# Patient Record
Sex: MALE | Race: WHITE
[De-identification: names, ages, dates, MRNs, and addresses within clinical notes are randomized per-mention and may not be internally consistent; named-entity substitution may affect disease eponyms.]

---

## 2019-03-07 ENCOUNTER — HOSPITAL ENCOUNTER (OUTPATIENT)
Dept: HOSPITAL 47 - RADCTMAIN | Age: 59
Discharge: HOME | End: 2019-03-07
Attending: FAMILY MEDICINE
Payer: COMMERCIAL

## 2019-03-07 DIAGNOSIS — R04.2: Primary | ICD-10-CM

## 2019-03-07 PROCEDURE — 71260 CT THORAX DX C+: CPT

## 2019-03-07 NOTE — CT
EXAMINATION TYPE: CT chest w con

 

DATE OF EXAM: 3/7/2019

 

COMPARISON: None

 

HISTORY: 58-year-old male abnormal CXR, hemoptysis

 

TECHNIQUE: Contiguous axial scanning of the chest after the administration of 100 mL of Isovue 300.  
Coronal/sagittal reconstructions performed.

 

CT DLP: 661.8mGycm. Automatic exposure control utilized for a dose reduction.

 

 

FINDINGS:

The heart is normal size without pericardial effusion.

 

Aorta normal caliber with both and configuration.

 

Borderline to mildly enlarged caliber to the main right and left pulmonary arteries and 2.8 and 2.5 c
m, respectively.

 

Scattered nonenlarged mediastinal and hilar lymph nodes. Precarinal lymph node measures 8 mm. Right h
ilar lymph node measures 9 mm. Bilateral bronchial lymph nodes measure up to 8 mm.

 

Evaluation of the lungs shows no consolidation or pleural effusion.

 

The central airways appear clear.

 

Visualized upper abdomen shows no gross abnormality.

 

Bones: Bridging anterior endplate spondylosis mid to lower thoracic spine compatible with DISH. Anter
ior wedging deformity of T7 has a chronic appearance. No retropulsion into the spinal canal.

 

 

 

IMPRESSION:  

 

1. No acute pulmonary process.

2. Some prominent but nonenlarged mediastinal and hilar lymph nodes are likely reactive/post inflamma
tory.

3. Possible underlying pulmonary arterial hypertension. Clinically correlate.

## 2019-03-22 ENCOUNTER — HOSPITAL ENCOUNTER (OUTPATIENT)
Dept: HOSPITAL 47 - RADECHMAIN | Age: 59
Discharge: HOME | End: 2019-03-22
Attending: FAMILY MEDICINE
Payer: COMMERCIAL

## 2019-03-22 DIAGNOSIS — I11.9: ICD-10-CM

## 2019-03-22 DIAGNOSIS — I34.0: Primary | ICD-10-CM

## 2019-03-22 DIAGNOSIS — R00.1: ICD-10-CM

## 2019-03-22 DIAGNOSIS — I37.1: ICD-10-CM

## 2019-03-22 PROCEDURE — 93306 TTE W/DOPPLER COMPLETE: CPT

## 2019-03-22 NOTE — ECHOF
Referral Reason:I10 hypertention



MEASUREMENTS

--------

HEIGHT: 182.9 cm

WEIGHT: 129.3 kg

BP: 

RVIDd:   2.9 cm     (< 3.3)

IVSd:   1.2 cm     (0.6 - 1.1)

LVIDd:   4.2 cm     (3.9 - 5.3)

LVPWd:   1.2 cm     (0.6 - 1.1)

IVSs:   1.7 cm

LVIDs:   2.3 cm

LVPWs:   1.5 cm

LAESV Index (A-L):   23.67 ml/m

Ao Diam:   3.4 cm     (2.0 - 3.7)

AV Cusp:   2.1 cm     (1.5 - 2.6)

LA Diam:   3.2 cm     (2.7 - 3.8)

MV EXCURSION:   20.130 mm     (> 18.000)

MV EF SLOPE:   150 mm/s     (70 - 150)

MV E Adriel:   0.78 m/s

MV DecT:   306 ms

MV A Adriel:   0.54 m/s

MV E/A Ratio:   1.42 

RAP:   5.00 mmHg

RVSP:   30.47 mmHg







FINDINGS

--------

Resting bradycardia (HR<60bpm).

This was a technically adequate study.

The left ventricular size is normal.   There is mild concentric left ventricular hypertrophy.   Overa
ll left ventricular systolic function is normal with, an EF between 55 - 60 %.

The right ventricle is normal in size and function.

Normal LA  size by volume 22+/-6 ml/m2.

RA appears enlarged.

Aortic valve is trileaflet and is mildly thickened.   There is no evidence of aortic regurgitation.  
 There is no evidence of aortic stenosis.

The mitral valve leaflets are mildly thickened.   There is trace to mild mitral regurgitation.

Trace tricuspid regurgitation present.   Right ventricular systolic pressure is normal at < 35 mmHg. 
  There is no evidence of pulmonary hypertension.

Trace/mild (physiologic)  pulmonic regurgitation.

The aortic root size is normal.

IVC Not well visulized.

There is no pericardial effusion.



CONCLUSIONS

--------

1. Resting bradycardia (HR<60bpm).

2. This was a technically adequate study.

3. The left ventricular size is normal.

4. There is mild concentric left ventricular hypertrophy.

5. Overall left ventricular systolic function is normal with, an EF between 55 - 60 %.

6. Normal LA size by volume 22+/-6 ml/m2.

7. RA appears enlarged.

8. Aortic valve is trileaflet and is mildly thickened.

9. The mitral valve leaflets are mildly thickened.

10. There is trace to mild mitral regurgitation.

11. Trace tricuspid regurgitation present.

12. Right ventricular systolic pressure is normal at < 35 mmHg.

13. There is no evidence of pulmonary hypertension.

14. Trace/mild (physiologic)  pulmonic regurgitation.

15. The aortic root size is normal.

16. IVC Not well visulized.

17. There is no pericardial effusion.





SONOGRAPHER: Alek Barreto RDCS

## 2019-09-19 ENCOUNTER — HOSPITAL ENCOUNTER (OUTPATIENT)
Dept: HOSPITAL 47 - RADXRMAIN | Age: 59
Discharge: HOME | End: 2019-09-19
Payer: COMMERCIAL

## 2019-09-19 DIAGNOSIS — S76.312A: Primary | ICD-10-CM

## 2019-09-19 NOTE — XR
EXAMINATION TYPE: XR femur LT

 

DATE OF EXAM: 9/19/2019

 

CLINICAL HISTORY: pain

 

TECHNIQUE:  Two views of the left femur are obtained.

 

COMPARISON: None.

 

FINDINGS:  There is no acute fracture or dislocation seen of the  femur.  The hip and knee joints evelia
ear within normal limits.  The overlying soft tissue appears unremarkable.

 

IMPRESSION:  

There is no acute fracture or dislocation seen of the femur.

 

ICD 10 NO FRACTURE, INITIAL EVALUATION

## 2021-02-08 ENCOUNTER — HOSPITAL ENCOUNTER (EMERGENCY)
Dept: HOSPITAL 47 - EC | Age: 61
Discharge: HOME | End: 2021-02-08
Payer: COMMERCIAL

## 2021-02-08 VITALS
SYSTOLIC BLOOD PRESSURE: 160 MMHG | TEMPERATURE: 98.4 F | DIASTOLIC BLOOD PRESSURE: 79 MMHG | RESPIRATION RATE: 18 BRPM | HEART RATE: 59 BPM
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
## 2021-02-08 DIAGNOSIS — S89.91XA: Primary | ICD-10-CM

## 2021-02-08 DIAGNOSIS — Z87.891: ICD-10-CM
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
## 2021-02-08 DIAGNOSIS — I10: ICD-10-CM

## 2021-02-08 DIAGNOSIS — Z91.030: ICD-10-CM

## 2021-02-08 DIAGNOSIS — N40.0: ICD-10-CM

## 2021-02-08 DIAGNOSIS — E78.5: ICD-10-CM

## 2021-02-08 DIAGNOSIS — Z79.899: ICD-10-CM
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
## 2021-02-08 PROCEDURE — 99283 EMERGENCY DEPT VISIT LOW MDM: CPT

## 2021-02-08 NOTE — XR
EXAM:

  XR Right Knee, 3 Views

 

CLINICAL HISTORY:

   Reason: injury PT STATES THERE WAS A BUILT UP PRESSURE FEELING AND 

THEN IT POPPED AND NOW HAS RT KNEE PAIN

 

TECHNIQUE:

  Three views of the right knee.

 

COMPARISON:

  No relevant prior studies available.

 

FINDINGS:

  Bones/joints:  Small enthesophyte near the inferior pole of the patella.

  No acute fracture.  No dislocation.

  Soft tissues:  Unremarkable.

 

IMPRESSION:     

1.  No evidence of acute fracture or dislocation.

2.  Small enthesophyte near the inferior pole of the patella.

3.  Correlate and consider MRI to assess for internal derangement.

## 2021-02-08 NOTE — ED
General Adult HPI





- General


Chief complaint: Extremity Injury, Lower


Stated complaint: IHS-knee injury


Time Seen by Provider: 02/08/21 06:26


Source: patient, RN notes reviewed


Mode of arrival: wheelchair


Limitations: no limitations





- History of Present Illness


Initial comments: 





Patient's a 60-year-old male presenting to the emergency room today with chief 

complaint of injury to the right knee that occurred 2 days ago.  He does admit 

that he was at work.  He states that he felt a tightness in his knee and felt a 

pop in the back of it.  He states he was just pushing open a door.  Patient 

states that over the weekend at [place] that was improving.  He states he 

followed back in to work today and was advised come here to emergency room for 

evaluation.  Patient does admit that he still having some discomfort that is 

worse with movements.  Patient denies any other injuries or any other 

complaints.  He states is not taking any medications for this. Patient denies 

any recent fever, chills, shortness of breath, chest pain, back pain, abdominal 

pain, nausea or vomiting, headaches or visual changes, or any other complaints.





- Related Data


                                Home Medications











 Medication  Instructions  Recorded  Confirmed


 


Bisoprol/Hydrochlorothiazide [Ziac 1 each PO HS 11/20/15 12/21/15





2.5-6.25 MG]   


 


Simvastatin [Zocor] 20 mg PO HS 11/20/15 12/21/15


 


Multivitamin [Men's Multi-Vitamin] 1 each PO DAILY 12/18/15 12/21/15


 


Potassium Chloride [Klor-Con 10] 10 meq PO DAILY 12/18/15 12/21/15


 


Tamsulosin HCl [Flomax] 0.4 mg PO HS 12/18/15 12/21/15








                                  Previous Rx's











 Medication  Instructions  Recorded


 


Ibuprofen [Motrin] 600 mg PO Q6HR PRN #40 day 02/08/21











                                    Allergies











Allergy/AdvReac Type Severity Reaction Status Date / Time


 


bee pollen Allergy  Anaphylaxis Verified 02/08/21 06:18














Review of Systems


ROS Statement: 


Those systems with pertinent positive or pertinent negative responses have been 

documented in the HPI.





ROS Other: All systems not noted in ROS Statement are negative.





Past Medical History


Past Medical History: Hyperlipidemia, Hypertension, Prostate Disorder


Additional Past Medical History / Comment(s): enlarged prostate, hx. closed head

 injury after industrial accident 1987


History of Any Multi-Drug Resistant Organisms: None Reported


Past Surgical History: Hernia Repair


Additional Past Surgical History / Comment(s): neuro surgery & arm surgery after

 accident 1987,


Past Anesthesia/Blood Transfusion Reactions: No Reported Reaction


Past Psychological History: No Psychological Hx Reported


Smoking Status: Former smoker


Past Alcohol Use History: Occasional


Past Drug Use History: None Reported





- Past Family History


  ** Father


Family Medical History: Cancer





General Exam





- General Exam Comments


Initial Comments: 





General:  The patient is awake and alert, in no distress, and does not appear ac

utely ill.   


Neck:  The neck is supple, there is no tenderness or JVD.  


Respiratory:  respirations are non-labored, breath sounds are equal. 


Musculoskeletal: Normal appearance the right knee was mild swelling.  Shows 

limited range of motion with flexion due to pain.  Does have some mild 

tenderness to the posterior lateral aspect.  Sensations are intact.  Pulses 

equal bilaterally 2+.


Neurological:  A&O x 3. CN II-XII intact, There are no obvious motor or sensory 

deficits. Coordination appears grossly intact. Speech is normal.


Skin:  Skin is warm and dry and no rashes or lesions are noted. 


Psychiatric:  Normal mood and affect.  


Limitations: no limitations





Course





                                   Vital Signs











  02/08/21





  06:12


 


Temperature 98.4 F


 


Pulse Rate 59 L


 


Respiratory 18





Rate 


 


Blood Pressure 160/79


 


O2 Sat by Pulse 96





Oximetry 














Medical Decision Making





- Medical Decision Making





X-ray was reviewed and does negative for any acute abnormality.  Results were 

discussed with the patient.  Patient is advised anti-inflammatories, ice elevate

 and follow-up with [facility] for further evaluation.  Patient states 

understanding and is in agreement.





Disposition


Clinical Impression: 


 Knee injury





Disposition: HOME SELF-CARE


Condition: Good


Instructions (If sedation given, give patient instructions):  Knee Sprain (ED)


Additional Instructions: 


Please continue to ice elevate the affected area.  Use anti-inflammatories for 

pain.  Follow with employee health over the next 2 days.  Return for any other 

concerns.


Prescriptions: 


Ibuprofen [Motrin] 600 mg PO Q6HR PRN #40 day


 PRN Reason: Pain


Is patient prescribed a controlled substance at d/c from ED?: No


Referrals: 


Stromberg,Reid, MD [Primary Care Provider] - 1-2 days


Time of Disposition: 06:52

## 2021-08-20 ENCOUNTER — HOSPITAL ENCOUNTER (EMERGENCY)
Dept: HOSPITAL 47 - EC | Age: 61
Discharge: HOME | End: 2021-08-20
Payer: COMMERCIAL

## 2021-08-20 VITALS — DIASTOLIC BLOOD PRESSURE: 87 MMHG | RESPIRATION RATE: 16 BRPM | HEART RATE: 66 BPM | SYSTOLIC BLOOD PRESSURE: 168 MMHG

## 2021-08-20 VITALS — TEMPERATURE: 98.4 F

## 2021-08-20 DIAGNOSIS — Z87.891: ICD-10-CM

## 2021-08-20 DIAGNOSIS — Z91.030: ICD-10-CM

## 2021-08-20 DIAGNOSIS — K40.90: Primary | ICD-10-CM

## 2021-08-20 DIAGNOSIS — E78.5: ICD-10-CM

## 2021-08-20 DIAGNOSIS — I10: ICD-10-CM

## 2021-08-20 PROCEDURE — 99283 EMERGENCY DEPT VISIT LOW MDM: CPT

## 2021-08-20 NOTE — ED
General Adult HPI





- General


Chief complaint: Abdominal Pain


Stated complaint: IHS/Possible Hernia


Time Seen by Provider: 08/20/21 18:41


Source: patient, RN notes reviewed, old records reviewed


Mode of arrival: ambulatory


Limitations: no limitations





- History of Present Illness


Initial comments: 





I evaluated the patient when he was placed in a room.  Patient is a 61-year-old 

male with past medical history remarkable for hypertension, hyperlipidemia, 

large prostate who presents emergency department after being sent by his work 

for a second evaluation for his right-sided inguinal hernia.  Patient is already

seen his PCP who diagnosed him with a right-sided inguinal hernia.  It is easily

reducible.  Patient states that yesterday he was seen by his PCP.  This is after

he expressed an episode where he had some acute swelling on the right side of 

his scrotum that is since improved.  He states it is also "itchy" at that time. 

He does have a history of a right-sided inguinal hernia that was never repaired 

on the right.  He is concerned this may have been what caused the swelling.  He 

states it is worse when he bears down or lifts something at work.  This is why 

presented to his PCP who diagnosed him with an inguinal hernia and already set 

him up with an appointment with a surgeon for August 31.  He denies any nausea, 

vomiting, diarrhea, change in bowel habits, and is still passing flatus.  Denies

any bloody bowel movements.  Denies any urinary complaints or discharge at this 

time.  His no concern for STI's.  Denies any abdominal pain at this time.  His 

no other acute complaints at this time.  Patient presents as he needs emergency 

department paperwork to provide his job.  He already has paperwork readying him 

off to 31st with the surgeon given to him by his PCP.





- Related Data


                                Home Medications











 Medication  Instructions  Recorded  Confirmed


 


Bisoprol/Hydrochlorothiazide [Ziac 1 each PO HS 11/20/15 12/21/15





2.5-6.25 MG]   


 


Simvastatin [Zocor] 20 mg PO HS 11/20/15 12/21/15


 


Multivitamin [Men's Multi-Vitamin] 1 each PO DAILY 12/18/15 12/21/15


 


Potassium Chloride [Klor-Con 10] 10 meq PO DAILY 12/18/15 12/21/15


 


Tamsulosin HCl [Flomax] 0.4 mg PO HS 12/18/15 12/21/15








                                  Previous Rx's











 Medication  Instructions  Recorded


 


Ibuprofen [Motrin] 600 mg PO Q6HR PRN #40 day 02/08/21











                                    Allergies











Allergy/AdvReac Type Severity Reaction Status Date / Time


 


bee pollen Allergy  Anaphylaxis Verified 08/20/21 17:38














Review of Systems


ROS Statement: 


Those systems with pertinent positive or pertinent negative responses have been 

documented in the HPI.


Review of Systems:


CONST: Denies fever 


EYES: Denies blurry vision 


ENT: Denies nasal congestion  


C/V:  Denies Chest pain


RESP: Denies shortness of breath 


GI: Denies abdominal pain 


: Denies dysuria  


SKIN: Denies rash.


MSK: Denies joint pain.


NEURO: Denies headache 


ROS Other: All systems not noted in ROS Statement are negative.





Past Medical History


Past Medical History: Hyperlipidemia, Hypertension, Prostate Disorder


Additional Past Medical History / Comment(s): enlarged prostate, hx. closed head

 injury after industrial accident 1987


History of Any Multi-Drug Resistant Organisms: None Reported


Past Surgical History: Hernia Repair


Additional Past Surgical History / Comment(s): neuro surgery & arm surgery after

 accident 1987,


Past Anesthesia/Blood Transfusion Reactions: No Reported Reaction


Past Psychological History: No Psychological Hx Reported


Smoking Status: Former smoker


Past Alcohol Use History: Occasional


Past Drug Use History: None Reported





- Past Family History


  ** Father


Family Medical History: Cancer





General Exam





- General Exam Comments


Initial Comments: 





General: Appears in no acute distress.


HEAD:  Normal with no signs of head trauma.


EYES:  PERRLA, EOMI, conjunctiva normal, no discharge.


ENT:  Hearing grossly intact, normal oropharynx.


RESPIRATORY:  Clear breath sounds bilaterally.  No wheezes, rales, or rhonchi.  


C/V:  Regular rate and rhythm. S1 and S2 auscultated, no edema, peripheral 

pulses 2+ and intact throughout


ABD:  Abd is soft, nontender, nondistended


EXT: Normal range of motion, no obvious deformity


SKIN:  No rashes or lesions observed on exposed skin.


NEURO: Alert and oriented 4.


: Performed in the presence of a male staff member.  Testicles are nontender 

to palpation.  His no discharge from the tip of the penis.  No palpable mass in 

the left inguinal space.  A small palpable mass in the right inguinal space that

 is easily reducible.  This appears to be a small hernia.  No color changes to 

suggest incarceration or pain.


Limitations: no limitations





Course


                                   Vital Signs











  08/20/21 08/20/21





  17:33 19:36


 


Temperature 98.4 F 98.4 F


 


Pulse Rate 59 L 66


 


Respiratory 18 16





Rate  


 


Blood Pressure 184/91 168/87


 


O2 Sat by Pulse 95 98





Oximetry  














Medical Decision Making





- Medical Decision Making





Based on patient's presentation and physical exam, he likely has a small right-

sided inguinal hernia that I have no concern for incarceration or strength 

regulation at this time.  He is otherwise asymptomatic.  He already has follow-

up appointment with surgery.  I do not believe that he requires any laboratory 

studies or imaging at this time.  Therefore do believe it is safe for him to be 

discharged home and he was in agreement with plan.  I will have him follow-up 

with his PCP as well as his prescheduled surgical appointment later this month. 

 He does not require work note as he already has one.  Patient was in agreement 

this plan.  Patient remains asymptomatic throughout his stay in the emergency 

department.





 I instructed the patient to follow up with their PCP in the next 3 days..  I 

explained that the patient should return to the emergency department if they 

experience any worsening symptoms. Strict return precautions were discussed with

 the patient. The patient expressed understanding of these instructions. I 

answered all questions that the patient had. The patient was discharged home in 

good condition with their prescriptions and follow up information.





Disposition


Clinical Impression: 


 Inguinal hernia





Disposition: HOME SELF-CARE


Condition: Good


Instructions (If sedation given, give patient instructions):  Inguinal Hernia 

(ED)


Is patient prescribed a controlled substance at d/c from ED?: No


Referrals: 


Stromberg,Reid, MD [Primary Care Provider] - 1-2 days

## 2021-09-16 ENCOUNTER — HOSPITAL ENCOUNTER (OUTPATIENT)
Dept: HOSPITAL 47 - LABPAT | Age: 61
Discharge: HOME | End: 2021-09-16
Attending: SURGERY
Payer: COMMERCIAL

## 2021-09-16 DIAGNOSIS — D64.9: ICD-10-CM

## 2021-09-16 DIAGNOSIS — Z01.812: Primary | ICD-10-CM

## 2021-09-16 LAB
ERYTHROCYTE [DISTWIDTH] IN BLOOD BY AUTOMATED COUNT: 4.9 M/UL (ref 4.3–5.9)
ERYTHROCYTE [DISTWIDTH] IN BLOOD: 12.3 % (ref 11.5–15.5)
HCT VFR BLD AUTO: 45.5 % (ref 39–53)
HGB BLD-MCNC: 16.4 GM/DL (ref 13–17.5)
MCH RBC QN AUTO: 33.4 PG (ref 25–35)
MCHC RBC AUTO-ENTMCNC: 36 G/DL (ref 31–37)
MCV RBC AUTO: 93 FL (ref 80–100)
PLATELET # BLD AUTO: 277 K/UL (ref 150–450)
WBC # BLD AUTO: 9.6 K/UL (ref 3.8–10.6)

## 2021-09-16 PROCEDURE — 84132 ASSAY OF SERUM POTASSIUM: CPT

## 2021-09-16 PROCEDURE — 85027 COMPLETE CBC AUTOMATED: CPT

## 2021-09-16 PROCEDURE — 36415 COLL VENOUS BLD VENIPUNCTURE: CPT

## 2021-09-16 PROCEDURE — 93005 ELECTROCARDIOGRAM TRACING: CPT

## 2021-09-22 ENCOUNTER — HOSPITAL ENCOUNTER (OUTPATIENT)
Dept: HOSPITAL 47 - OR | Age: 61
Discharge: HOME | End: 2021-09-22
Attending: SURGERY
Payer: COMMERCIAL

## 2021-09-22 VITALS — HEART RATE: 64 BPM | DIASTOLIC BLOOD PRESSURE: 71 MMHG | SYSTOLIC BLOOD PRESSURE: 130 MMHG | RESPIRATION RATE: 18 BRPM

## 2021-09-22 VITALS — BODY MASS INDEX: 38.5 KG/M2

## 2021-09-22 VITALS — TEMPERATURE: 96.9 F

## 2021-09-22 DIAGNOSIS — K40.91: Primary | ICD-10-CM

## 2021-09-22 DIAGNOSIS — E78.5: ICD-10-CM

## 2021-09-22 DIAGNOSIS — Z87.891: ICD-10-CM

## 2021-09-22 DIAGNOSIS — K21.9: ICD-10-CM

## 2021-09-22 DIAGNOSIS — I10: ICD-10-CM

## 2021-09-22 DIAGNOSIS — G47.30: ICD-10-CM

## 2021-09-22 PROCEDURE — 64999 UNLISTED PX NERVOUS SYSTEM: CPT

## 2021-09-22 PROCEDURE — 88302 TISSUE EXAM BY PATHOLOGIST: CPT

## 2021-09-22 PROCEDURE — 49651 LAP ING HERNIA REPAIR RECUR: CPT

## 2021-09-22 RX ADMIN — HYDROMORPHONE HYDROCHLORIDE PRN MG: 1 INJECTION, SOLUTION INTRAMUSCULAR; INTRAVENOUS; SUBCUTANEOUS at 10:44

## 2021-09-22 RX ADMIN — HYDROMORPHONE HYDROCHLORIDE PRN MG: 1 INJECTION, SOLUTION INTRAMUSCULAR; INTRAVENOUS; SUBCUTANEOUS at 10:36

## 2021-09-22 NOTE — P.ANPRN
Procedure Note - Anesthesia





- Nerve Block Performed


  ** Bilateral Erector Spinae


Time Out Performed: Yes (08:17)


Date of Procedure: 09/22/21


Procedure Start Time: 08:17


Procedure Stop Time: 08:29


Location of Patient: PreOp


Indication: Acute Post-Operative Pain, Requested by Surgeon (Frederic)


Sedation Type: Sedate with meaningful contact maintained


Preparation: Sterile Prep


Position: Prone


Catheter: None


Needle Types: Pajunk


Needle Gauge: 21


Ultrasound used to visualize needle placement: Yes


Ultrasound used to observe medication spread: Yes


Injectate: 0.5% Ropivacaine (see comment for volume) (15cc +10cc PFNormal saline

each side)


Blood Aspirated: No


Pain Paresthesia on Injection Noted: No


Resistance on Injection: Normal


Image Stored and Saved: Yes


Events: Uneventful and Well Tolerated

## 2021-09-22 NOTE — P.GSHP
History of Present Illness


H&P Date: 09/22/21


Chief Complaint: Recurrent right inguinal hernia





This is a 61-year-old male who presents today for laparoscopic robotic





Recurrent right inguinal hernia.





Past Medical History


Past Medical History: Hyperlipidemia, Hypertension, Prostate Disorder, Skin 

Disorder, Sleep Apnea/CPAP/BIPAP


Additional Past Medical History / Comment(s): enlarged prostate, hx. closed head

injury after industrial accident 1987, rash and dry skin behind ears and on 

face, `


History of Any Multi-Drug Resistant Organisms: None Reported


Past Surgical History: Hernia Repair, Tonsillectomy


Additional Past Surgical History / Comment(s): repaired hole on top of scalp  & 

arm surgery after accident 1987, vasectomy


Past Anesthesia/Blood Transfusion Reactions: No Reported Reaction


Smoking Status: Former smoker





- Past Family History


  ** Father


Family Medical History: Cancer





Medications and Allergies


                                Home Medications











 Medication  Instructions  Recorded  Confirmed  Type


 


Simvastatin [Zocor] 20 mg PO HS 11/20/15 09/22/21 History


 


Multivitamin [Men's Multi-Vitamin] 1 each PO DAILY 12/18/15 09/22/21 History


 


Tamsulosin HCl [Flomax] 0.4 mg PO HS 12/18/15 09/22/21 History


 


Bisoprol/Hydrochlorothiazide [Ziac 1 each PO DAILY 09/01/21 09/22/21 History





5-6.25 MG]    








                                    Allergies











Allergy/AdvReac Type Severity Reaction Status Date / Time


 


bee pollen Allergy  Anaphylaxis Verified 09/22/21 07:13














Surgical - Exam


                                   Vital Signs











Temp Pulse Resp BP Pulse Ox


 


 98.4 F   57 L  17   182/87   95 


 


 09/22/21 07:11  09/22/21 07:11  09/22/21 07:11  09/22/21 07:11  09/22/21 07:11














- General


well developed, well nourished, no distress





- Eyes


PERRL





- ENT


normal pinna





- Neck


no masses





- Respiratory


normal expansion





- Cardiovascular


Rhythm: regular





- Abdomen





Recurrent right inguinal hernia


Abdomen: soft, non tender





Assessment and Plan


Assessment: 


Recurrent right inguinal hernia.  We'll perform laparoscopic robotic-assisted 

repair.

## 2025-01-05 ENCOUNTER — HOSPITAL ENCOUNTER (EMERGENCY)
Dept: HOSPITAL 47 - EC | Age: 65
Discharge: HOME | End: 2025-01-05
Payer: COMMERCIAL

## 2025-01-05 VITALS
TEMPERATURE: 98 F | HEART RATE: 75 BPM | DIASTOLIC BLOOD PRESSURE: 96 MMHG | RESPIRATION RATE: 20 BRPM | SYSTOLIC BLOOD PRESSURE: 175 MMHG

## 2025-01-05 DIAGNOSIS — R33.9: Primary | ICD-10-CM

## 2025-01-05 DIAGNOSIS — K62.89: ICD-10-CM

## 2025-01-05 DIAGNOSIS — Z87.891: ICD-10-CM

## 2025-01-05 DIAGNOSIS — Z91.030: ICD-10-CM

## 2025-01-05 DIAGNOSIS — R19.7: ICD-10-CM

## 2025-01-05 LAB
ALBUMIN SERPL-MCNC: 5.1 G/DL (ref 3.5–5)
ALP SERPL-CCNC: 47 U/L (ref 38–126)
ALT SERPL-CCNC: 31 U/L (ref 4–49)
ANION GAP SERPL CALC-SCNC: 12 MMOL/L
APTT BLD: 23.9 SEC (ref 22–30)
AST SERPL-CCNC: 39 U/L (ref 17–59)
BASOPHILS # BLD AUTO: 0.1 K/UL (ref 0–0.2)
BASOPHILS NFR BLD AUTO: 1 %
BUN SERPL-SCNC: 24 MG/DL (ref 9–20)
CALCIUM SPEC-MCNC: 9.8 MG/DL (ref 8.4–10.2)
CHLORIDE SERPL-SCNC: 103 MMOL/L (ref 98–107)
CO2 SERPL-SCNC: 24 MMOL/L (ref 22–30)
EOSINOPHIL # BLD AUTO: 0.5 K/UL (ref 0–0.7)
EOSINOPHIL NFR BLD AUTO: 5 %
ERYTHROCYTE [DISTWIDTH] IN BLOOD BY AUTOMATED COUNT: 5.09 M/UL (ref 4.3–5.9)
ERYTHROCYTE [DISTWIDTH] IN BLOOD: 12.3 % (ref 11.5–15.5)
GLUCOSE SERPL-MCNC: 124 MG/DL (ref 74–99)
HCT VFR BLD AUTO: 45.7 % (ref 39–53)
HGB BLD-MCNC: 15.6 GM/DL (ref 13–17.5)
HYALINE CASTS UR QL AUTO: 1 /LPF (ref 0–2)
INR PPP: 1 (ref ?–1.2)
LYMPHOCYTES # SPEC AUTO: 3 K/UL (ref 1–4.8)
LYMPHOCYTES NFR SPEC AUTO: 25 %
MCH RBC QN AUTO: 30.6 PG (ref 25–35)
MCHC RBC AUTO-ENTMCNC: 34 G/DL (ref 31–37)
MCV RBC AUTO: 89.8 FL (ref 80–100)
MONOCYTES # BLD AUTO: 0.9 K/UL (ref 0–1)
MONOCYTES NFR BLD AUTO: 7 %
NEUTROPHILS # BLD AUTO: 7.2 K/UL (ref 1.3–7.7)
NEUTROPHILS NFR BLD AUTO: 60 %
PH UR: 5 [PH] (ref 5–8)
PLATELET # BLD AUTO: 261 K/UL (ref 150–450)
POTASSIUM SERPL-SCNC: 4.6 MMOL/L (ref 3.5–5.1)
PROT SERPL-MCNC: 7.9 G/DL (ref 6.3–8.2)
PT BLD: 11.3 SEC (ref 10–12.5)
RBC UR QL: <1 /HPF (ref 0–5)
SODIUM SERPL-SCNC: 139 MMOL/L (ref 137–145)
SP GR UR: 1.03 (ref 1–1.03)
SQUAMOUS UR QL AUTO: <1 /HPF (ref 0–4)
UROBILINOGEN UR QL STRIP: <2 MG/DL (ref ?–2)
WBC # BLD AUTO: 11.9 K/UL (ref 3.8–10.6)
WBC # UR AUTO: 9 /HPF (ref 0–5)

## 2025-01-05 PROCEDURE — 85025 COMPLETE CBC W/AUTO DIFF WBC: CPT

## 2025-01-05 PROCEDURE — 51798 US URINE CAPACITY MEASURE: CPT

## 2025-01-05 PROCEDURE — 51702 INSERT TEMP BLADDER CATH: CPT

## 2025-01-05 PROCEDURE — 80053 COMPREHEN METABOLIC PANEL: CPT

## 2025-01-05 PROCEDURE — 85730 THROMBOPLASTIN TIME PARTIAL: CPT

## 2025-01-05 PROCEDURE — 81001 URINALYSIS AUTO W/SCOPE: CPT

## 2025-01-05 PROCEDURE — 83605 ASSAY OF LACTIC ACID: CPT

## 2025-01-05 PROCEDURE — 36415 COLL VENOUS BLD VENIPUNCTURE: CPT

## 2025-01-05 PROCEDURE — 99285 EMERGENCY DEPT VISIT HI MDM: CPT

## 2025-01-05 PROCEDURE — 96360 HYDRATION IV INFUSION INIT: CPT

## 2025-01-05 PROCEDURE — 85610 PROTHROMBIN TIME: CPT

## 2025-01-05 PROCEDURE — 74177 CT ABD & PELVIS W/CONTRAST: CPT

## 2025-01-05 PROCEDURE — 87086 URINE CULTURE/COLONY COUNT: CPT

## 2025-01-05 RX ADMIN — CEFAZOLIN ONE MLS/HR: 330 INJECTION, POWDER, FOR SOLUTION INTRAMUSCULAR; INTRAVENOUS at 08:16

## 2025-01-05 RX ADMIN — LIDOCAINE 4% ONE APPLIC: 4 CREAM TOPICAL at 08:16

## 2025-01-05 NOTE — ED
GI Bleed HPI





- General


Chief complaint: GI Bleed


Stated complaint: Blood in stool


Time Seen by Provider: 01/05/25 05:48


Source: patient, RN notes reviewed


Mode of arrival: ambulatory


Limitations: no limitations





- History of Present Illness


Initial comments: 


64-year-old male presents emergency department chief complaint abdominal pain, 

urinary frequency and diarrhea.  Patient states that every time he has to 

urinate does not feel he completely empties but states he also has to have a 

bowel movement he states that because of this he has had increasing abdominal 

pain, rectal pain he has noticed some bleeding.  Patient denies any fevers or 

chills he denies any blood thinners denies chest pain shortness of breath, flank

pain denies any history of any GI bleeds or urinary symptoms.








- Related Data


                                Home Medications











 Medication  Instructions  Recorded  Confirmed


 


Simvastatin [Zocor] 20 mg PO HS 11/20/15 09/22/21


 


Multivitamin [Men's Multi-Vitamin] 1 each PO DAILY 12/18/15 09/22/21


 


Tamsulosin HCl [Flomax] 0.4 mg PO HS 12/18/15 09/22/21


 


Bisoprolol/Hydrochlorothiazide 1 each PO DAILY 09/01/21 09/22/21





[Ziac 5-6.25 MG]   








                                  Previous Rx's











 Medication  Instructions  Recorded


 


Acetaminophen Tab [Tylenol] 650 mg PO Q6H #30 tab 09/22/21


 


Docusate [Colace] 100 mg PO BID #20 cap 09/22/21


 


Ibuprofen [Motrin] 600 mg PO Q6HR PRN #40 tab 09/22/21


 


oxyCODONE HCL [OxyIR] 5 mg PO Q6H PRN 3 Days #10 tab 09/22/21


 


Cephalexin [Keflex] 500 mg PO Q6HR 7 Days #28 cap 04/08/22


 


Ciprofloxacin HCl [Cipro] 500 mg PO Q12HR #14 tablet 01/05/25











                                    Allergies











Allergy/AdvReac Type Severity Reaction Status Date / Time


 


bee pollen Allergy  Anaphylaxis Verified 01/05/25 05:40














Review of Systems


ROS Statement: 


Those systems with pertinent positive or pertinent negative responses have been 

documented in the HPI.





ROS Other: All systems not noted in ROS Statement are negative.





Past Medical History


Past Medical History: Hyperlipidemia, Hypertension, Prostate Disorder, Skin 

Disorder, Sleep Apnea/CPAP/BIPAP


Additional Past Medical History / Comment(s): enlarged prostate, hx. closed head

 injury after industrial accident 1987, rash and dry skin behind ears and on 

face, `


History of Any Multi-Drug Resistant Organisms: None Reported


Past Surgical History: Hernia Repair, Tonsillectomy


Additional Past Surgical History / Comment(s): repaired hole on top of scalp  & 

arm surgery after accident 1987, vasectomy


Past Anesthesia/Blood Transfusion Reactions: No Reported Reaction


Past Psychological History: No Psychological Hx Reported


Smoking Status: Former smoker


Past Alcohol Use History: Occasional


Past Drug Use History: None Reported





- Past Family History


  ** Father


Family Medical History: Cancer





General Exam


Limitations: no limitations


General appearance: alert, in no apparent distress


Head exam: Present: atraumatic, normocephalic, normal inspection


Eye exam: Present: normal appearance, PERRL, EOMI.  Absent: scleral icterus, 

conjunctival injection, periorbital swelling


ENT exam: Present: normal exam, mucous membranes moist


Neck exam: Present: normal inspection, full ROM.  Absent: tenderness, 

meningismus, lymphadenopathy


Respiratory exam: Present: normal lung sounds bilaterally.  Absent: respiratory 

distress, wheezes, rales, rhonchi, stridor


Cardiovascular Exam: Present: regular rate, normal rhythm, normal heart sounds. 

 Absent: systolic murmur, diastolic murmur, rubs, gallop, clicks


GI/Abdominal exam: Present: soft, tenderness, normal bowel sounds.  Absent: 

distended, guarding, rebound, rigid





Course


                                   Vital Signs











  01/05/25 01/05/25 01/05/25





  05:31 08:15 08:18


 


Temperature 97.9 F 98.9 F 


 


Pulse Rate 95 61 


 


Respiratory 18 18 





Rate   


 


Blood Pressure 136/83 179/83 


 


O2 Sat by Pulse 97 93 L 95





Oximetry   














  01/05/25





  09:00


 


Temperature 


 


Pulse Rate 76


 


Respiratory 18





Rate 


 


Blood Pressure 161/75


 


O2 Sat by Pulse 96





Oximetry 














Medical Decision Making





- Medical Decision Making


Was pt. sent in by a medical professional or institution (, PA, NP, urgent 

care, hospital, or nursing home...) When possible be specific


@ -No


Did you speak to anyone other than the patient for history (EMS, parent, family,

 police, friend...)? What history was obtained from this source 


@ -No


Did you review nursing and triage notes (agree or disagree)? Why? 


@ -I reviewed and agree with nursing and triage notes


Were old charts reviewed (outside hosp., previous admission, EMS record, old 

EKG, old radiological studies, urgent care reports/EKG's, nursing home records)?

 Report findings 


@ -No old charts were reviewed


Differential Diagnosis (chest pain, altered mental status, abdominal pain women,

 abdominal pain men, vaginal bleeding, weakness, fever, dyspnea, syncope, 

headache, dizziness, GI bleed, back pain, seizure, CVA, palpatations, mental 

health, musculoskeletal)? 


@ -Differential Abdominal Pain Men:


Appendicitis, cholecystitis, diverticulosis, ischemic bowel, pancreatitis, 

hepatitis, UTI, gastroenteritis, AAA, incarcerated hernia, bowel obstruction, 

constipation, inflammatory bowel, hepatitis, peptic ulcer disease, splenic 

infarction, perforated viscus, testicular torsion, this is not meant to be an a

ll-inclusive list





EKG interpreted by me (3pts min.).


@ -None


X-rays interpreted by me (1pt min.).


@ -None done


CT interpreted by me (1pt min.).


@ -CT abdomen and pelvis showing distended bladder no other acute process no 

evidence of infection


U/S interpreted by me (1pt. min.).


@ -None done


What testing was considered but not performed or refused? (CT, X-rays, U/S, 

labs)? Why?


@ -None


What meds were considered but not given or refused? Why?


@ -None


Did you discuss the management of the patient with other professionals 

(professionals i.e. , PA, NP, lab, RT, psych nurse, , , 

teacher, , )? Give summary


@ -No


Was smoking cessation discussed for >3mins.?


@ -No


Was critical care preformed (if so, how long)?


@ -No


Were there social determinants of health that impacted care today? How? 

(Homelessness, low income, unemployed, alcoholism, drug addiction, 

transportation, low edu. Level, literacy, decrease access to med. care, retirement, 

rehab)?


@ -No


Was there de-escalation of care discussed even if they declined (Discuss DNR or 

withdrawal of care, Hospice)? DNR status


@ -No


What co-morbidities impacted this encounter? (DM, HTN, Smoking, COPD, CAD, Ca

ncer, CVA, ARF, Chemo, Hep., AIDS, mental health diagnosis, sleep apnea, morbid 

obesity)?


@ -None


Was patient admitted / discharged? Hospital course, mention meds given and 

route, prescriptions, significant lab abnormalities, going to OR and other 

pertinent info.


@ -Discharge patient found to have significant urinary retention, Jurado catheter

 was placed felt greatly improved patient does have questionable UTI start 

antibiotics given increasing BPH and urinary retention symptoms.  Patient had 

significant diarrhea causing rectal irritation is improved after lidocaine we 

did discuss sitz bath's, Tucks pads, barrier creams.  Patient will follow-up 

with urology and PCP.


Undiagnosed new problem with uncertain prognosis?


@ -No


Drug Therapy requiring intensive monitoring for toxicity (Heparin, Nitro, 

Insulin, Cardizem)?


@ -No


Were any procedures done?


@ -No


Diagnosis/symptom?


@ -Abdominal pain, diarrhea, urinary tension


Acute, or Chronic, or Acute on Chronic?


@ -[Acute


Uncomplicated (without systemic symptoms) or Complicated (systemic symptoms)?


@ -Complicated


Side effects of treatment?


@ -No


Exacerbation, Progression, or Severe Exacerbation?


@ -No


Poses a threat to life or bodily function? How? (Chest pain, USA, MI, pneumonia,

 PE, COPD, DKA, ARF, appy, cholecystitis, CVA, Diverticulitis, Homicidal, 

Suicidal, threat to staff... and all critical care pts)


@ -No








- Lab Data


Result diagrams: 


                                 01/05/25 06:28





                                 01/05/25 06:28


                                   Lab Results











  01/05/25 01/05/25 01/05/25 Range/Units





  06:28 06:28 06:28 


 


WBC  11.9 H    (3.8-10.6)  k/uL


 


RBC  5.09    (4.30-5.90)  m/uL


 


Hgb  15.6    (13.0-17.5)  gm/dL


 


Hct  45.7    (39.0-53.0)  %


 


MCV  89.8    (80.0-100.0)  fL


 


MCH  30.6    (25.0-35.0)  pg


 


MCHC  34.0    (31.0-37.0)  g/dL


 


RDW  12.3    (11.5-15.5)  %


 


Plt Count  261    (150-450)  k/uL


 


MPV  7.0    


 


Neutrophils %  60    %


 


Lymphocytes %  25    %


 


Monocytes %  7    %


 


Eosinophils %  5    %


 


Basophils %  1    %


 


Neutrophils #  7.2    (1.3-7.7)  k/uL


 


Lymphocytes #  3.0    (1.0-4.8)  k/uL


 


Monocytes #  0.9    (0-1.0)  k/uL


 


Eosinophils #  0.5    (0-0.7)  k/uL


 


Basophils #  0.1    (0-0.2)  k/uL


 


PT   11.3   (10.0-12.5)  sec


 


INR   1.0   (<1.2)  


 


APTT   23.9   (22.0-30.0)  sec


 


Sodium    139  (137-145)  mmol/L


 


Potassium    4.6  (3.5-5.1)  mmol/L


 


Chloride    103  ()  mmol/L


 


Carbon Dioxide    24  (22-30)  mmol/L


 


Anion Gap    12  mmol/L


 


BUN    24 H  (9-20)  mg/dL


 


Creatinine    1.37 H  (0.66-1.25)  mg/dL


 


Est GFR (CKD-EPI)AfAm    63  (>60 ml/min/1.73 sqM)  


 


Est GFR (CKD-EPI)NonAf    54  (>60 ml/min/1.73 sqM)  


 


Glucose    124 H  (74-99)  mg/dL


 


Plasma Lactic Acid Olman     (0.7-2.0)  mmol/L


 


Calcium    9.8  (8.4-10.2)  mg/dL


 


Total Bilirubin    1.4 H  (0.2-1.3)  mg/dL


 


AST    39  (17-59)  U/L


 


ALT    31  (4-49)  U/L


 


Alkaline Phosphatase    47  ()  U/L


 


Total Protein    7.9  (6.3-8.2)  g/dL


 


Albumin    5.1 H  (3.5-5.0)  g/dL


 


Urine Color     


 


Urine Appearance     (Clear)  


 


Urine pH     (5.0-8.0)  


 


Ur Specific Gravity     (1.001-1.035)  


 


Urine Protein     (Negative)  


 


Urine Glucose (UA)     (Negative)  


 


Urine Ketones     (Negative)  


 


Urine Blood     (Negative)  


 


Urine Nitrite     (Negative)  


 


Urine Bilirubin     (Negative)  


 


Urine Urobilinogen     (<2.0)  mg/dL


 


Ur Leukocyte Esterase     (Negative)  


 


Urine RBC     (0-5)  /hpf


 


Urine WBC     (0-5)  /hpf


 


Ur Squamous Epith Cells     (0-4)  /hpf


 


Hyaline Casts     (0-2)  /lpf


 


Urine Mucus     (None)  /hpf














  01/05/25 01/05/25 Range/Units





  06:28 07:50 


 


WBC    (3.8-10.6)  k/uL


 


RBC    (4.30-5.90)  m/uL


 


Hgb    (13.0-17.5)  gm/dL


 


Hct    (39.0-53.0)  %


 


MCV    (80.0-100.0)  fL


 


MCH    (25.0-35.0)  pg


 


MCHC    (31.0-37.0)  g/dL


 


RDW    (11.5-15.5)  %


 


Plt Count    (150-450)  k/uL


 


MPV    


 


Neutrophils %    %


 


Lymphocytes %    %


 


Monocytes %    %


 


Eosinophils %    %


 


Basophils %    %


 


Neutrophils #    (1.3-7.7)  k/uL


 


Lymphocytes #    (1.0-4.8)  k/uL


 


Monocytes #    (0-1.0)  k/uL


 


Eosinophils #    (0-0.7)  k/uL


 


Basophils #    (0-0.2)  k/uL


 


PT    (10.0-12.5)  sec


 


INR    (<1.2)  


 


APTT    (22.0-30.0)  sec


 


Sodium    (137-145)  mmol/L


 


Potassium    (3.5-5.1)  mmol/L


 


Chloride    ()  mmol/L


 


Carbon Dioxide    (22-30)  mmol/L


 


Anion Gap    mmol/L


 


BUN    (9-20)  mg/dL


 


Creatinine    (0.66-1.25)  mg/dL


 


Est GFR (CKD-EPI)AfAm    (>60 ml/min/1.73 sqM)  


 


Est GFR (CKD-EPI)NonAf    (>60 ml/min/1.73 sqM)  


 


Glucose    (74-99)  mg/dL


 


Plasma Lactic Acid Olman  1.0   (0.7-2.0)  mmol/L


 


Calcium    (8.4-10.2)  mg/dL


 


Total Bilirubin    (0.2-1.3)  mg/dL


 


AST    (17-59)  U/L


 


ALT    (4-49)  U/L


 


Alkaline Phosphatase    ()  U/L


 


Total Protein    (6.3-8.2)  g/dL


 


Albumin    (3.5-5.0)  g/dL


 


Urine Color   Light Yellow  


 


Urine Appearance   Clear  (Clear)  


 


Urine pH   5.0  (5.0-8.0)  


 


Ur Specific Gravity   1.031  (1.001-1.035)  


 


Urine Protein   Negative  (Negative)  


 


Urine Glucose (UA)   Negative  (Negative)  


 


Urine Ketones   Negative  (Negative)  


 


Urine Blood   Negative  (Negative)  


 


Urine Nitrite   Negative  (Negative)  


 


Urine Bilirubin   Negative  (Negative)  


 


Urine Urobilinogen   <2.0  (<2.0)  mg/dL


 


Ur Leukocyte Esterase   Small H  (Negative)  


 


Urine RBC   <1  (0-5)  /hpf


 


Urine WBC   9 H  (0-5)  /hpf


 


Ur Squamous Epith Cells   <1  (0-4)  /hpf


 


Hyaline Casts   1  (0-2)  /lpf


 


Urine Mucus   Rare H  (None)  /hpf














Disposition


Clinical Impression: 


 Urinary retention, Rectal pain, Diarrhea





Disposition: HOME SELF-CARE


Condition: Stable


Instructions (If sedation given, give patient instructions):  Urinary Retention 

in Men (ED)


Additional Instructions: 


Please return to the Emergency Department if symptoms worsen or any other 

concerns.


Prescriptions: 


Ciprofloxacin HCl [Cipro] 500 mg PO Q12HR #14 tablet


Is patient prescribed a controlled substance at d/c from ED?: No


Referrals: 


Stromberg,Reid, MD [Primary Care Provider] - 1-2 days


Arben Amaya MD [STAFF PHYSICIAN] - 1-2 days


Time of Disposition: 08:53

## 2025-01-05 NOTE — CT
EXAMINATION TYPE: CT abdomen pelvis w con

 

DATE OF EXAM: 1/5/2025

 

COMPARISON: None.

 

CLINICAL INDICATION: Male, 64 years old with history of abd pain; PHH, abd pain

 

TECHNIQUE:

Performed without Oral Contrast and with IV Contrast, patient injected with 100 mL of Isovue 300.

CT DLP: 2628.6 mGycm

CT CTDI: 48 mGy

Automated exposure control for dose reduction was used.

 

FINDINGS: 

 

LUNG BASES: No significant abnormality is appreciated.

 

LIVER/GB: Liver is heterogeneously hypodense consistent with diffuse fatty infiltrative hepatocellula
r disease.

 

PANCREAS: No significant abnormality is seen.

 

SPLEEN: No significant abnormality is seen.

 

ADRENALS: No significant abnormality is seen.

 

KIDNEYS: Moderate bilateral hydronephrosis is seen. No obstructing ureteric calculus bilaterally.

 

FREE AIR:  No free air is visualized.

 

RETROPERITONEAL ADENOPATHY:  None visualized

 

REPRODUCTIVE ORGANS: Enlarged prostate consistent with BPH is bulging on bladder base

 

URINARY BLADDER:  Moderately distended bladder. No intraluminal calculus in bladder.

 

PELVIC ADENOPATHY:  None visualized.

 

OSSEOUS STRUCTURES:  Mild to moderate narrowing and spurring in both hip joints.

 

BOWEL:  A few distal colonic diverticula. No CT evidence for acute diverticulitis. No abnormal small 
or large bowel dilatation. Appendix appears within normal limits for base of cecum.

 

OTHER: Asymmetric small to moderate-sized fat-containing left inguinal hernia

 

IMPRESSION: 

MODERATELY DISTENDED BLADDER. SUSPECT OUTLET OBSTRUCTION RELATED TO BPH. THIS IS CAUSING AT LEAST MOD
ERATE BILATERAL HYDRONEPHROSIS AND DELAYED RENAL EXCRETION. CONSIDER ZAMORA CATHETERIZATION IF PATIENT
 UNABLE TO VOID.

 

 

 

 

[facility], Workstation: [id number], 1/5/2025 7:20 AM